# Patient Record
Sex: MALE | Race: WHITE | URBAN - METROPOLITAN AREA
[De-identification: names, ages, dates, MRNs, and addresses within clinical notes are randomized per-mention and may not be internally consistent; named-entity substitution may affect disease eponyms.]

---

## 2022-11-10 ENCOUNTER — TELEPHONE (OUTPATIENT)
Dept: PAIN CLINIC | Facility: CLINIC | Age: 57
End: 2022-11-10

## 2022-11-10 NOTE — TELEPHONE ENCOUNTER
Received fax for new patient appointment. Appointment request completed and given to , approval received. Patient has been scheduled for 11/21. Records sent to scanning.

## 2022-11-21 ENCOUNTER — OFFICE VISIT (OUTPATIENT)
Dept: PAIN CLINIC | Facility: CLINIC | Age: 57
End: 2022-11-21
Payer: COMMERCIAL

## 2022-11-21 ENCOUNTER — TELEPHONE (OUTPATIENT)
Dept: NEUROLOGY | Facility: CLINIC | Age: 57
End: 2022-11-21

## 2022-11-21 VITALS
HEART RATE: 76 BPM | DIASTOLIC BLOOD PRESSURE: 98 MMHG | WEIGHT: 223 LBS | SYSTOLIC BLOOD PRESSURE: 134 MMHG | OXYGEN SATURATION: 98 %

## 2022-11-21 DIAGNOSIS — M54.16 LUMBAR RADICULOPATHY: Primary | ICD-10-CM

## 2022-11-21 DIAGNOSIS — M54.16 LUMBAR RADICULAR PAIN: ICD-10-CM

## 2022-11-21 DIAGNOSIS — R29.898 LEG WEAKNESS, BILATERAL: Primary | ICD-10-CM

## 2022-11-21 DIAGNOSIS — R26.81 GAIT INSTABILITY: ICD-10-CM

## 2022-11-21 PROCEDURE — 3075F SYST BP GE 130 - 139MM HG: CPT | Performed by: NURSE PRACTITIONER

## 2022-11-21 PROCEDURE — 3080F DIAST BP >= 90 MM HG: CPT | Performed by: NURSE PRACTITIONER

## 2022-11-21 PROCEDURE — 99204 OFFICE O/P NEW MOD 45 MIN: CPT | Performed by: NURSE PRACTITIONER

## 2022-11-21 RX ORDER — IBUPROFEN 800 MG/1
TABLET ORAL
COMMUNITY
Start: 2022-08-31

## 2022-11-21 RX ORDER — PREDNISONE 20 MG/1
40 TABLET ORAL DAILY
COMMUNITY
Start: 2022-11-08

## 2022-11-21 RX ORDER — ALBUTEROL SULFATE 90 UG/1
2 AEROSOL, METERED RESPIRATORY (INHALATION) EVERY 6 HOURS PRN
COMMUNITY
Start: 2022-08-15

## 2022-11-21 RX ORDER — ENALAPRIL MALEATE 10 MG/1
TABLET ORAL
COMMUNITY
Start: 2022-08-15

## 2022-11-21 RX ORDER — FLUTICASONE FUROATE, UMECLIDINIUM BROMIDE AND VILANTEROL TRIFENATATE 100; 62.5; 25 UG/1; UG/1; UG/1
1 POWDER RESPIRATORY (INHALATION) DAILY
COMMUNITY
Start: 2022-11-08

## 2022-11-21 RX ORDER — HYDROCHLOROTHIAZIDE 12.5 MG/1
CAPSULE, GELATIN COATED ORAL
COMMUNITY
Start: 2022-08-15

## 2022-11-21 RX ORDER — DICLOFENAC SODIUM 75 MG/1
75 TABLET, DELAYED RELEASE ORAL 2 TIMES DAILY
COMMUNITY
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
## 2022-11-21 NOTE — TELEPHONE ENCOUNTER
Initiated PA with [facility] for [address] / Filled out PA form and faxed with confrimation to [phone]

## 2022-12-02 NOTE — TELEPHONE ENCOUNTER
Called [facility] and spoke with Krzysztof Landis who informed me that the PA was faxed over to us, which was not, and they had no reason why it was faxed to us. She informed me she was going to re-send it as urgent to the nurse reviewer with a turn around time of 24 hours.

## 2022-12-05 NOTE — TELEPHONE ENCOUNTER
Pt calling to check status of approval. States he needs to get injection done within the next 2 weeks.

## 2022-12-05 NOTE — TELEPHONE ENCOUNTER
Called [facility] and spoke to [address] [place] [address] V who informed me the turn around time is 10 calendar days and the case is currently under nurse review.  Was provided a case # of [id number]

## 2022-12-06 ENCOUNTER — HOSPITAL ENCOUNTER (OUTPATIENT)
Dept: MRI IMAGING | Age: 57
Discharge: HOME | End: 2022-12-06
Attending: NURSE PRACTITIONER
Payer: COMMERCIAL

## 2022-12-06 DIAGNOSIS — R29.898 LEG WEAKNESS, BILATERAL: ICD-10-CM

## 2022-12-06 PROCEDURE — 72141 MRI NECK SPINE W/O DYE: CPT | Performed by: NURSE PRACTITIONER

## 2022-12-06 PROCEDURE — 72146 MRI CHEST SPINE W/O DYE: CPT | Performed by: NURSE PRACTITIONER

## 2022-12-06 NOTE — TELEPHONE ENCOUNTER
Patient's wife mentioned that he is scheduled for 2 MRI's today for the Thoracic and Cervical region. Advised patient's wife that once MRI results are complete Sara Moctezuma will review and if there needs to be a change once it comes to the injection we will advise. Patient's wife ODIN.

## 2022-12-06 NOTE — TELEPHONE ENCOUNTER
Prior authorization request completed for: [employer]   Authorization # [id number]  Authorization dates: 11/21/2022 - 2/21/2023  CPT codes approved: 20249  Number of visits/dates of service approved: 1  Physician: Dr. Elizabeth Triana   Location: [facility]     Patient can be scheduled. Routed to Navigator.

## 2022-12-07 ENCOUNTER — TELEPHONE (OUTPATIENT)
Dept: SURGERY | Facility: CLINIC | Age: 57
End: 2022-12-07

## 2022-12-07 NOTE — TELEPHONE ENCOUNTER
Informed patient that [facility] requested I call him to schedule an appointment with Dr. Navid Moreno and if patient would be available today I could schedule an appointment at [facility].  Patient indicated that he has an appointment next Tuesday to get epidural injection why does he need another appointment? Patient wanted me to tell him what was wrong that he needed to see this Dr.  Patient was told I am not a nurse so I am unable to tell him. Patient indicated that he could come next Tuesday while he is here for epidural injection. I asked pt if he could come on Wednesday next week at our [facility] location. Patient indicated that he has to work and can't be taking time off to keep going to Dr. Mirta Burton. Patient again asked if I could tell him what the problem is that he needs to see Dr. Navid Moreno. At this point I informed patient that someone from the Pain group would call him and explain. Call was then disconnected. No appointment was made.

## 2022-12-08 NOTE — TELEPHONE ENCOUNTER
Contacted pt via [employer] ID #[id number]    Attempted to reach pt on mobile. Pt's spouse answered and advised to call on alt phone #.  was not able to hear the pt or myself, therefore call was ended.  ID #[id number]    Contacted pt to relay info below through . Pt asked if the upcoming injection would be cxl'd, which was confirmed and explained that it will only be postponed until Dr Carline Dubin could evaluate. Pt expressed frustration, saying he does not know how long he can put this off and he can't be going to the doctor every other day. Offered to transfer the call to NS for scheduling w/ Dr Carline Dubin, however pt hung up.

## 2022-12-08 NOTE — TELEPHONE ENCOUNTER
Spoke with pt's wife and relayed all information to her. She states she will talk to her  and call to schedule.

## 2022-12-20 ENCOUNTER — OFFICE VISIT (OUTPATIENT)
Dept: SURGERY | Facility: CLINIC | Age: 57
End: 2022-12-20
Payer: COMMERCIAL

## 2022-12-20 VITALS
DIASTOLIC BLOOD PRESSURE: 82 MMHG | SYSTOLIC BLOOD PRESSURE: 138 MMHG | OXYGEN SATURATION: 97 % | WEIGHT: 223 LBS | HEIGHT: 69 IN | BODY MASS INDEX: 33.03 KG/M2 | HEART RATE: 77 BPM

## 2022-12-20 DIAGNOSIS — R29.898 LEG WEAKNESS, BILATERAL: Primary | ICD-10-CM

## 2022-12-20 PROCEDURE — 3008F BODY MASS INDEX DOCD: CPT | Performed by: NEUROLOGICAL SURGERY

## 2022-12-20 PROCEDURE — 99204 OFFICE O/P NEW MOD 45 MIN: CPT | Performed by: NEUROLOGICAL SURGERY

## 2022-12-20 PROCEDURE — 3075F SYST BP GE 130 - 139MM HG: CPT | Performed by: NEUROLOGICAL SURGERY

## 2022-12-20 PROCEDURE — 3079F DIAST BP 80-89 MM HG: CPT | Performed by: NEUROLOGICAL SURGERY

## 2022-12-27 ENCOUNTER — TELEPHONE (OUTPATIENT)
Dept: PAIN CLINIC | Facility: CLINIC | Age: 57
End: 2022-12-27